# Patient Record
(demographics unavailable — no encounter records)

---

## 2024-10-28 NOTE — HISTORY OF PRESENT ILLNESS
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  MRI showed a large brain tumor extending to the base of the anterior cranial fossa.  For therapeutic and diagnostic purposes, the decision was made to proceed with surgery.  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  Recent hospital admissin 5/13/24: Presented with episode of syncope in setting of poor PO intake for 2 days due to nausea due to flu.  No head strike, CTH negative for acute pathology, CT-PE negative for PE. EKG sinus with TWI in III. TTE wnl. Flu A+ on admission. L Renal Cyst and Large Hiatal hernia found on Ct Chest-patient to follow-up with her PCP.    5/17/24-Patient presents for follow-up MRI review from inpatient hospital stay at [facility] 5/13/24 for influenza. Patient endorses no acute changes in her condition since her last visit with us. Denies headache, changes in speech/vision, nausea/vomiting, numbness/tingling/weakness in extremities, abnormal movement of extremities.  5/13/24: MRI brain 10/26/24: MRI brain new enhancement   TODAY 10/28/2024 presents for a routine MRI review from 10/26/24 which shows some new enhancement She continues to reside at [facility] where she is continuing her care. She is not taking any TMZ. She denies any symptoms at this time.

## 2024-10-28 NOTE — PHYSICAL EXAM
[General Appearance - Alert] : alert [General Appearance - In No Acute Distress] : in no acute distress [Oriented To Time, Place, And Person] : oriented to person, place, and time [Person] : oriented to person [Place] : oriented to place [Time] : oriented to time [Fluency] : fluency intact [Comprehension] : comprehension intact [Reading] : reading intact [Motor Tone] : muscle tone was normal in all four extremities [Motor Strength] : muscle strength was normal in all four extremities [Balance] : balance was intact [Neck Appearance] : the appearance of the neck was normal [] : no respiratory distress [Edema] : there was no peripheral edema [Abnormal Walk] : normal gait

## 2024-10-28 NOTE — DATA REVIEWED
[de-identified] : I have reviewed the most recent MRI on 10/25/24 at [facility] which shows ""

## 2024-10-28 NOTE — REVIEW OF SYSTEMS
[Feeling Poorly] : not feeling poorly [Feeling Tired] : not feeling tired [Confused or Disoriented] : no confusion [Arm Weakness] : no arm weakness [Leg Weakness] : no leg weakness [Numbness] : no numbness [Tingling] : no tingling [Seizures] : no convulsions [Dizziness] : no dizziness [Cluster Headache] : no cluster headache [Difficulty Walking] : no difficulty walking [Anxiety] : no anxiety [Depression] : no depression [Eyesight Problems] : no eyesight problems [Earache] : no earache [Loss Of Hearing] : no hearing loss [Chest Pain] : no chest pain [Palpitations] : no palpitations [Shortness Of Breath] : no shortness of breath [Abdominal Pain] : no abdominal pain [Incontinence] : no incontinence

## 2024-10-28 NOTE — DATA REVIEWED
[de-identified] : I have reviewed the most recent MRI on 10/25/24 at [facility] which shows ""

## 2024-10-28 NOTE — HISTORY OF PRESENT ILLNESS
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  MRI showed a large brain tumor extending to the base of the anterior cranial fossa.  For therapeutic and diagnostic purposes, the decision was made to proceed with surgery.  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  Recent hospital admissin 5/13/24: Presented with episode of syncope in setting of poor PO intake for 2 days due to nausea due to flu.  No head strike, CTH negative for acute pathology, CT-PE negative for PE. EKG sinus with TWI in III. TTE wnl. Flu A+ on admission. L Renal Cyst and Large Hiatal hernia found on Ct Chest-patient to follow-up with her PCP.    5/17/24-Patient presents for follow-up MRI review from inpatient hospital stay at [facility] 5/13/24 for influenza. Patient endorses no acute changes in her condition since her last visit with us. Denies headache, changes in speech/vision, nausea/vomiting, numbness/tingling/weakness in extremities, abnormal movement of extremities.  5/13/24: MRI brain 10/26/24: MRI brain new enhancement   TODAY 10/28/2024 presents for a routine MRI review from 10/26/24 which shows some new enhancement She continues to reside at [facility] where she is continuing her care. She is not taking any TMZ. She denies any symptoms at this time.

## 2024-10-28 NOTE — ASSESSMENT
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 Her most recent MRI from 10/26/24 shows evidence of new enhancement in left frontal lobe compared to her scan in May. Her KPS is 100. I had a long discussion with the patient regarding the role of short interval MRI brain to understand what this enhancement represents. If there is evidence of growth- the patient should consider SRS. The patient was extensively educated about the nature of her disease process. She will be presented in tumor board next week. Therapeutic and diagnostic tests include MRI brain with and without contrast with 3D FLAIR sequencing in 4 weeks November 2024.  I will see the patient back in November 2024.I have explained the alternatives, risks and benefits to the patient and she understands and agrees to proceed.

## 2024-11-05 NOTE — REASON FOR VISIT
[Home] : at home, [unfilled] , at the time of the visit. [Medical Office: ([facility])___] : at the medical office located in  [Patient] : the patient [Self] : self [Follow-Up: _____] : a [unfilled] follow-up visit

## 2024-11-05 NOTE — REASON FOR VISIT
[Home] : at home, [unfilled] , at the time of the visit. [Medical Office: ([facility])___] : at the medical office located in  [Patient] : the patient [Self] : self [Follow-Up: _____] : a [unfilled] follow-up visit

## 2024-11-08 NOTE — ASSESSMENT
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 Her most recent MRI from 10/26/24 shows evidence of new enhancement in left frontal lobe compared to her scan in May. Her KPS is 100. I had a long discussion with the patient regarding the role of careful observation with imaging in 4-6 weeks. If there is evidence of growth- the patient should consider SRS after next MRI can be done in [place]. The patient was extensively educated about the nature of her disease process. Therapeutic and diagnostic tests include MRI brain with and without contrast with 3D FLAIR sequencing in 4 weeks early December 2024. I will see the patient back in November 2024.I have explained the alternatives, risks and benefits to the patient and she understands and agrees to proceed.

## 2024-11-08 NOTE — HISTORY OF PRESENT ILLNESS
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  MRI showed a large brain tumor extending to the base of the anterior cranial fossa.  For therapeutic and diagnostic purposes, the decision was made to proceed with surgery.  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  Recent hospital admissin 5/13/24: Presented with episode of syncope in setting of poor PO intake for 2 days due to nausea due to flu.  No head strike, CTH negative for acute pathology, CT-PE negative for PE. EKG sinus with TWI in III. TTE wnl. Flu A+ on admission. L Renal Cyst and Large Hiatal hernia found on Ct Chest-patient to follow-up with her PCP.    5/17/24-Patient presents for follow-up MRI review from inpatient hospital stay at [facility] 5/13/24 for influenza. Patient endorses no acute changes in her condition since her last visit with us. Denies headache, changes in speech/vision, nausea/vomiting, numbness/tingling/weakness in extremities, abnormal movement of extremities.  5/13/24: MRI brain 10/26/24: MRI brain new enhancement   10/28/2024 presents for a routine MRI review from 10/26/24 which shows some new enhancement She continues to reside at [facility] where she is continuing her care. She is not taking any TMZ. She denies any symptoms at this time.   TODAY 11/08/2024 presents for a telehealth discussion to review tumor board recommendations.

## 2024-11-08 NOTE — HISTORY OF PRESENT ILLNESS
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  MRI showed a large brain tumor extending to the base of the anterior cranial fossa.  For therapeutic and diagnostic purposes, the decision was made to proceed with surgery.  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  Recent hospital admissin 5/13/24: Presented with episode of syncope in setting of poor PO intake for 2 days due to nausea due to flu.  No head strike, CTH negative for acute pathology, CT-PE negative for PE. EKG sinus with TWI in III. TTE wnl. Flu A+ on admission. L Renal Cyst and Large Hiatal hernia found on Ct Chest-patient to follow-up with her PCP.    5/17/24-Patient presents for follow-up MRI review from inpatient hospital stay at [facility] 5/13/24 for influenza. Patient endorses no acute changes in her condition since her last visit with us. Denies headache, changes in speech/vision, nausea/vomiting, numbness/tingling/weakness in extremities, abnormal movement of extremities.  5/13/24: MRI brain 10/26/24: MRI brain new enhancement   10/28/2024 presents for a routine MRI review from 10/26/24 which shows some new enhancement She continues to reside at [facility] where she is continuing her care. She is not taking any TMZ. She denies any symptoms at this time.   TODAY 11/08/2024 presents for a telehealth discussion to review tumor board recommendations.

## 2024-12-09 NOTE — HISTORY OF PRESENT ILLNESS
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  MRI showed a large brain tumor extending to the base of the anterior cranial fossa.  For therapeutic and diagnostic purposes, the decision was made to proceed with surgery.  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  Recent hospital admission 5/13/24: Presented with episode of syncope in setting of poor PO intake for 2 days due to nausea due to flu.  No head strike, CTH negative for acute pathology, CT-PE negative for PE. EKG sinus with TWI in III. TTE wnl. Flu A+ on admission. L Renal Cyst and Large Hiatal hernia found on Ct Chest-patient to follow-up with her PCP.    5/17/24-Patient presents for follow-up MRI review from inpatient hospital stay at [facility] 5/13/24 for influenza. Patient endorses no acute changes in her condition since her last visit with us. Denies headache, changes in speech/vision, nausea/vomiting, numbness/tingling/weakness in extremities, abnormal movement of extremities.  5/13/24: MRI brain 10/26/24: MRI brain new enhancement   10/28/2024 presents for a routine MRI review from 10/26/24 which shows some new enhancement She continues to reside at [facility] where she is continuing her care. She is not taking any TMZ. She denies any symptoms at this time.  11/08/2024 presents for a telehealth discussion to review tumor board recommendations. Discussed short interval MRI and follow-up in weeks with 3D Flair and potential SRS locally if progression on the next imaging.   TODAY 12/09/2024 presents to review 4 week interval scan with 3D flair for new enhancement in left frontal lobe.

## 2024-12-09 NOTE — HISTORY OF PRESENT ILLNESS
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  MRI showed a large brain tumor extending to the base of the anterior cranial fossa.  For therapeutic and diagnostic purposes, the decision was made to proceed with surgery.  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  Recent hospital admission 5/13/24: Presented with episode of syncope in setting of poor PO intake for 2 days due to nausea due to flu.  No head strike, CTH negative for acute pathology, CT-PE negative for PE. EKG sinus with TWI in III. TTE wnl. Flu A+ on admission. L Renal Cyst and Large Hiatal hernia found on Ct Chest-patient to follow-up with her PCP.    5/17/24-Patient presents for follow-up MRI review from inpatient hospital stay at [facility] 5/13/24 for influenza. Patient endorses no acute changes in her condition since her last visit with us. Denies headache, changes in speech/vision, nausea/vomiting, numbness/tingling/weakness in extremities, abnormal movement of extremities.  5/13/24: MRI brain 10/26/24: MRI brain new enhancement   10/28/2024 presents for a routine MRI review from 10/26/24 which shows some new enhancement She continues to reside at [facility] where she is continuing her care. She is not taking any TMZ. She denies any symptoms at this time.  11/08/2024 presents for a telehealth discussion to review tumor board recommendations. Discussed short interval MRI and follow-up in weeks with 3D Flair and potential SRS locally if progression on the next imaging.   TODAY 12/09/2024 presents to review 4 week interval scan with 3D flair for new enhancement in left frontal lobe.

## 2024-12-09 NOTE — DATA REVIEWED
[de-identified] : I have reviewed the most recent MRI on 12/9/24 at [facility] which shows table left frontal region since last MRI 10/29/24 s/p resection Oligodendroglioma III 11/2/23.

## 2024-12-09 NOTE — PHYSICAL EXAM
[General Appearance - Alert] : alert [General Appearance - In No Acute Distress] : in no acute distress [Oriented To Time, Place, And Person] : oriented to person, place, and time [Affect] : the affect was normal [Cranial Nerves Oculomotor (III)] : extraocular motion intact [Cranial Nerves Trigeminal (V)] : facial sensation intact symmetrically [Cranial Nerves Facial (VII)] : face symmetrical [Cranial Nerves Glossopharyngeal (IX)] : tongue and palate midline [Cranial Nerves Accessory (XI - Cranial And Spinal)] : head turning and shoulder shrug symmetric [Cranial Nerves Hypoglossal (XII)] : there was no tongue deviation with protrusion [Motor Tone] : muscle tone was normal in all four extremities [Motor Strength] : muscle strength was normal in all four extremities [Sclera] : the sclera and conjunctiva were normal [Extraocular Movements] : extraocular movements were intact [Full Visual Field] : full visual field [Outer Ear] : the ears and nose were normal in appearance [] : no respiratory distress

## 2024-12-09 NOTE — DATA REVIEWED
[de-identified] : I have reviewed the most recent MRI on 12/9/24 at [facility] which shows table left frontal region since last MRI 10/29/24 s/p resection Oligodendroglioma III 11/2/23.

## 2024-12-09 NOTE — ASSESSMENT
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 Her most recent MRI from today 12/9/24 shows stable left frontal region since last MRI 10/29/24 . Her KPS is 100. I had a long discussion with the patient regarding the role of continued surveillance. The patient was extensively educated about the nature of her disease process. Therapeutic and diagnostic tests include MRI brain with and without contrast with 3D FLAIR sequencing after 2/10/24. I will see the patient back in after her MRI.I have explained the alternatives, risks and benefits to the patient and she understands and agrees to proceed.

## 2025-02-10 NOTE — REVIEW OF SYSTEMS
[Feeling Poorly] : not feeling poorly [Feeling Tired] : not feeling tired [Confused or Disoriented] : no confusion [Arm Weakness] : no arm weakness [Leg Weakness] : no leg weakness [Numbness] : no numbness [Tingling] : no tingling [Seizures] : no convulsions [Dizziness] : no dizziness [Cluster Headache] : no cluster headache [Difficulty Walking] : no difficulty walking [Anxiety] : no anxiety [Depression] : no depression [Eyesight Problems] : no eyesight problems

## 2025-02-10 NOTE — ASSESSMENT
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 Her most recent MRI from today 2/10/25 is stable. Her KPS is 100. I had a long discussion with the patient regarding the role of continued surveillance. The patient was extensively educated about the nature of her disease process. Therapeutic and diagnostic tests include MRI brain with and without contrast with 3D FLAIR sequencing in 4 months (June 2025). I will see the patient back after that MRI to review and check progress. I have explained the alternatives, risks and benefits to the patient and she understands and agrees to proceed.

## 2025-02-10 NOTE — HISTORY OF PRESENT ILLNESS
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  MRI showed a large brain tumor extending to the base of the anterior cranial fossa.  For therapeutic and diagnostic purposes, the decision was made to proceed with surgery.  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  Recent hospital admission 5/13/24: Presented with episode of syncope in setting of poor PO intake for 2 days due to nausea due to flu.  No head strike, CTH negative for acute pathology, CT-PE negative for PE. EKG sinus with TWI in III. TTE wnl. Flu A+ on admission. L Renal Cyst and Large Hiatal hernia found on Ct Chest-patient to follow-up with her PCP.    5/17/24-Patient presents for follow-up MRI review from inpatient hospital stay at [facility] 5/13/24 for influenza. Patient endorses no acute changes in her condition since her last visit with us. Denies headache, changes in speech/vision, nausea/vomiting, numbness/tingling/weakness in extremities, abnormal movement of extremities.  5/13/24: MRI brain 10/26/24: MRI brain new enhancement   10/28/2024 presents for a routine MRI review from 10/26/24 which shows some new enhancement She continues to reside at [facility] where she is continuing her care. She is not taking any TMZ. She denies any symptoms at this time.  11/08/2024 presents for a telehealth discussion to review tumor board recommendations. Discussed short interval MRI and follow-up in weeks with 3D Flair and potential SRS locally if progression on the next imaging.  12/09/2024 presents to review 4 week interval scan with 3D flair for new enhancement in left frontal lobe.   2/10/25: MRI brain stable   TODAY 02/10/2025 presents for a routine MRI review. MRI completed at [facility] today. She is currently only taking xanax as needed. Patient denies any seizure like activity or worsening symptoms.

## 2025-02-10 NOTE — HISTORY OF PRESENT ILLNESS
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  MRI showed a large brain tumor extending to the base of the anterior cranial fossa.  For therapeutic and diagnostic purposes, the decision was made to proceed with surgery.  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  Recent hospital admission 5/13/24: Presented with episode of syncope in setting of poor PO intake for 2 days due to nausea due to flu.  No head strike, CTH negative for acute pathology, CT-PE negative for PE. EKG sinus with TWI in III. TTE wnl. Flu A+ on admission. L Renal Cyst and Large Hiatal hernia found on Ct Chest-patient to follow-up with her PCP.    5/17/24-Patient presents for follow-up MRI review from inpatient hospital stay at [facility] 5/13/24 for influenza. Patient endorses no acute changes in her condition since her last visit with us. Denies headache, changes in speech/vision, nausea/vomiting, numbness/tingling/weakness in extremities, abnormal movement of extremities.  5/13/24: MRI brain 10/26/24: MRI brain new enhancement   10/28/2024 presents for a routine MRI review from 10/26/24 which shows some new enhancement She continues to reside at [facility] where she is continuing her care. She is not taking any TMZ. She denies any symptoms at this time.  11/08/2024 presents for a telehealth discussion to review tumor board recommendations. Discussed short interval MRI and follow-up in weeks with 3D Flair and potential SRS locally if progression on the next imaging.  12/09/2024 presents to review 4 week interval scan with 3D flair for new enhancement in left frontal lobe.   2/10/25: MRI brain stable   TODAY 02/10/2025 presents for a routine MRI review. MRI completed at [facility] today. She is currently only taking xanax as needed. Patient denies any seizure like activity or worsening symptoms.

## 2025-02-10 NOTE — PHYSICAL EXAM
[General Appearance - Alert] : alert [General Appearance - In No Acute Distress] : in no acute distress [Oriented To Time, Place, And Person] : oriented to person, place, and time [Person] : oriented to person [Place] : oriented to place [Time] : oriented to time [Motor Tone] : muscle tone was normal in all four extremities [Motor Strength] : muscle strength was normal in all four extremities [Abnormal Walk] : normal gait [Balance] : balance was intact [Sclera] : the sclera and conjunctiva were normal [] : no respiratory distress [Edema] : there was no peripheral edema [No Spinal Tenderness] : no spinal tenderness

## 2025-06-02 NOTE — ASSESSMENT
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 Her most recent MRI from today 6/2/25 is showing slow progression. Her KPS is 100. I had a long discussion with the patient regarding the role of seeing a neurooncologist to discuss options of treatment. The patient was extensively educated about the nature of her disease process. I set her up in [place] with Dr Bernal.  I have explained the alternatives, risks and benefits to the patient and she understands and agrees to proceed.

## 2025-06-02 NOTE — HISTORY OF PRESENT ILLNESS
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  MRI showed a large brain tumor extending to the base of the anterior cranial fossa.  For therapeutic and diagnostic purposes, the decision was made to proceed with surgery.  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  Recent hospital admission 5/13/24: Presented with episode of syncope in setting of poor PO intake for 2 days due to nausea due to flu.  No head strike, CTH negative for acute pathology, CT-PE negative for PE. EKG sinus with TWI in III. TTE wnl. Flu A+ on admission. L Renal Cyst and Large Hiatal hernia found on Ct Chest-patient to follow-up with her PCP.    5/17/24-Patient presents for follow-up MRI review from inpatient hospital stay at [facility] 5/13/24 for influenza. Patient endorses no acute changes in her condition since her last visit with us. Denies headache, changes in speech/vision, nausea/vomiting, numbness/tingling/weakness in extremities, abnormal movement of extremities.  5/13/24: MRI brain 10/26/24: MRI brain new enhancement   10/28/2024 presents for a routine MRI review from 10/26/24 which shows some new enhancement She continues to reside at [facility] where she is continuing her care. She is not taking any TMZ. She denies any symptoms at this time.  11/08/2024 presents for a telehealth discussion to review tumor board recommendations. Discussed short interval MRI and follow-up in weeks with [employer] and potential SRS locally if progression on the next imaging.  12/09/2024 presents to review 4 week interval scan with [facility] for new enhancement in left frontal lobe.   2/10/25: MRI brain stable   02/10/2025 presents for a routine MRI review. MRI completed at [facility] today. She is currently only taking xanax as needed. Patient denies any seizure like activity or worsening symptoms.    TODAY 06/02/2025 presents for a routine MRI review.

## 2025-06-02 NOTE — DATA REVIEWED
[de-identified] : I have reviewed the most recent MRI on 6/2/25 at [facility] which shows growth from her last imaging.

## 2025-06-02 NOTE — DATA REVIEWED
[de-identified] : I have reviewed the most recent MRI on 6/2/25 at [facility] which shows growth from her last imaging.

## 2025-06-02 NOTE — PHYSICAL EXAM
[General Appearance - Alert] : alert [General Appearance - In No Acute Distress] : in no acute distress [Oriented To Time, Place, And Person] : oriented to person, place, and time [Affect] : the affect was normal [Cranial Nerves Oculomotor (III)] : extraocular motion intact [Cranial Nerves Trigeminal (V)] : facial sensation intact symmetrically [Cranial Nerves Facial (VII)] : face symmetrical [Cranial Nerves Glossopharyngeal (IX)] : tongue and palate midline [Cranial Nerves Accessory (XI - Cranial And Spinal)] : head turning and shoulder shrug symmetric [Cranial Nerves Hypoglossal (XII)] : there was no tongue deviation with protrusion [Motor Tone] : muscle tone was normal in all four extremities [Motor Strength] : muscle strength was normal in all four extremities [Sclera] : the sclera and conjunctiva were normal [] : no respiratory distress [Abnormal Walk] : normal gait

## 2025-06-02 NOTE — HISTORY OF PRESENT ILLNESS
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  MRI showed a large brain tumor extending to the base of the anterior cranial fossa.  For therapeutic and diagnostic purposes, the decision was made to proceed with surgery.  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  Recent hospital admission 5/13/24: Presented with episode of syncope in setting of poor PO intake for 2 days due to nausea due to flu.  No head strike, CTH negative for acute pathology, CT-PE negative for PE. EKG sinus with TWI in III. TTE wnl. Flu A+ on admission. L Renal Cyst and Large Hiatal hernia found on Ct Chest-patient to follow-up with her PCP.    5/17/24-Patient presents for follow-up MRI review from inpatient hospital stay at [facility] 5/13/24 for influenza. Patient endorses no acute changes in her condition since her last visit with us. Denies headache, changes in speech/vision, nausea/vomiting, numbness/tingling/weakness in extremities, abnormal movement of extremities.  5/13/24: MRI brain 10/26/24: MRI brain new enhancement   10/28/2024 presents for a routine MRI review from 10/26/24 which shows some new enhancement She continues to reside at [facility] where she is continuing her care. She is not taking any TMZ. She denies any symptoms at this time.  11/08/2024 presents for a telehealth discussion to review tumor board recommendations. Discussed short interval MRI and follow-up in weeks with [facility] and potential SRS locally if progression on the next imaging.  12/09/2024 presents to review 4 week interval scan with [facility] for new enhancement in left frontal lobe.   2/10/25: MRI brain stable   02/10/2025 presents for a routine MRI review. MRI completed at [facility] today. She is currently only taking xanax as needed. Patient denies any seizure like activity or worsening symptoms.    TODAY 06/02/2025 presents for a routine MRI review.